# Patient Record
Sex: FEMALE | Race: WHITE
[De-identification: names, ages, dates, MRNs, and addresses within clinical notes are randomized per-mention and may not be internally consistent; named-entity substitution may affect disease eponyms.]

---

## 2018-11-27 ENCOUNTER — HOSPITAL ENCOUNTER (EMERGENCY)
Dept: HOSPITAL 62 - ER | Age: 23
Discharge: HOME | End: 2018-11-27
Payer: COMMERCIAL

## 2018-11-27 VITALS — DIASTOLIC BLOOD PRESSURE: 86 MMHG | SYSTOLIC BLOOD PRESSURE: 107 MMHG

## 2018-11-27 DIAGNOSIS — M79.89: ICD-10-CM

## 2018-11-27 DIAGNOSIS — M79.605: Primary | ICD-10-CM

## 2018-11-27 LAB
ADD MANUAL DIFF: NO
BASOPHILS # BLD AUTO: 0 10^3/UL (ref 0–0.2)
BASOPHILS NFR BLD AUTO: 0.5 % (ref 0–2)
EOSINOPHIL # BLD AUTO: 0.3 10^3/UL (ref 0–0.6)
EOSINOPHIL NFR BLD AUTO: 3.1 % (ref 0–6)
ERYTHROCYTE [DISTWIDTH] IN BLOOD BY AUTOMATED COUNT: 12.4 % (ref 11.5–14)
HCT VFR BLD CALC: 42.7 % (ref 36–47)
HGB BLD-MCNC: 14.9 G/DL (ref 12–15.5)
LYMPHOCYTES # BLD AUTO: 1.9 10^3/UL (ref 0.5–4.7)
LYMPHOCYTES NFR BLD AUTO: 22.1 % (ref 13–45)
MCH RBC QN AUTO: 29.5 PG (ref 27–33.4)
MCHC RBC AUTO-ENTMCNC: 34.9 G/DL (ref 32–36)
MCV RBC AUTO: 85 FL (ref 80–97)
MONOCYTES # BLD AUTO: 0.7 10^3/UL (ref 0.1–1.4)
MONOCYTES NFR BLD AUTO: 7.7 % (ref 3–13)
NEUTROPHILS # BLD AUTO: 5.7 10^3/UL (ref 1.7–8.2)
NEUTS SEG NFR BLD AUTO: 66.6 % (ref 42–78)
PLATELET # BLD: 236 10^3/UL (ref 150–450)
RBC # BLD AUTO: 5.05 10^6/UL (ref 3.72–5.28)
TOTAL CELLS COUNTED % (AUTO): 100 %
WBC # BLD AUTO: 8.6 10^3/UL (ref 4–10.5)

## 2018-11-27 PROCEDURE — 36415 COLL VENOUS BLD VENIPUNCTURE: CPT

## 2018-11-27 PROCEDURE — 85025 COMPLETE CBC W/AUTO DIFF WBC: CPT

## 2018-11-27 PROCEDURE — 81025 URINE PREGNANCY TEST: CPT

## 2018-11-27 PROCEDURE — 99284 EMERGENCY DEPT VISIT MOD MDM: CPT

## 2018-11-27 PROCEDURE — 93971 EXTREMITY STUDY: CPT

## 2018-11-27 NOTE — ER DOCUMENT REPORT
ED Medical Screen (RME)





- General


Chief Complaint: Leg Pain


Stated Complaint: LEG SWELLING


Time Seen by Provider: 11/27/18 19:59


TRAVEL OUTSIDE OF THE [place] IN LAST 30 DAYS: No





- Related Data


Allergies/Adverse Reactions: 


 





No Known Allergies Allergy (Unverified 11/27/18 17:56)


 











Past Medical History





- Social History


Frequency of alcohol use: Occasional


Renal/ Medical History: Denies: Hx Peritoneal Dialysis


Past Surgical History: Reports: Hx Tonsillectomy





Physical Exam





- Vital signs


Vitals: 





 











Temp Pulse Resp BP Pulse Ox


 


 98.0 F   79   16   109/71   98 


 


 11/27/18 18:09  11/27/18 18:09  11/27/18 18:09  11/27/18 18:09  11/27/18 18:09














Course





- Re-evaluation


Re-evalutation: 





11/27/18 20:09


22-year-old female presents for evaluation of a swollen left lower extremity 

following an 11-hour car trip.


She was seen at urgent care and told to come to the emergency room for further 

investigation.


We will obtain ultrasound of the left lower extremity will obtain pregnancy 

test and CBC.


I have seen and performed a rapid medical screening examination on this patient.


This patient will require further evaluation and disposition determination by a 

secondary provider.





- Vital Signs


Vital signs: 





 











Temp Pulse Resp BP Pulse Ox


 


 98.0 F   79   16   109/71   98 


 


 11/27/18 18:09  11/27/18 18:09  11/27/18 19:59  11/27/18 18:09  11/27/18 18:09

## 2018-11-27 NOTE — ER DOCUMENT REPORT
ED Extremity Problem, Lower





- General


Chief Complaint: Leg Pain


Stated Complaint: LEG SWELLING


Time Seen by Provider: 11/27/18 19:59


Mode of Arrival: Ambulatory


Information source: Patient


TRAVEL OUTSIDE OF THE [place] IN LAST 30 DAYS: No





- HPI


Patient complains to provider of: Pain


Location: Leg


Occurred: This afternoon


Onset/Duration: Gradual


Quality of pain: Achy


Severity: Mild


Pain Level: 2


Recent injury: No


Exacerbated by: Movement


Notes: 





Patient is a 22-year-old female presenting to the emergency room complaining of 

pain in her left leg, patient just completed an 11 [address] Dr[address] from [facility] and arrived to [facility] at 4 PM today, at 

which time she promptly signed herself into the emergency room because she was 

having pain in the left leg and was concerned about the possibility of a DVT, 

patient has no history of DVT, she does not smoke cigarettes, she did recently 

quit, and she does not currently take any over-the-counter contraceptive, there 

is no injury to the lower extremity, she denies any chest pain or shortness of 

breath, simply states that she saw something on a television show regarding a 

DVT and therefore became concerned after her long drive today





- Related Data


Allergies/Adverse Reactions: 


 





No Known Allergies Allergy (Unverified 11/27/18 17:56)


 











Past Medical History





- General


Information source: Patient





- Social History


Smoking Status: Former Smoker


Frequency of alcohol use: Occasional


Family History: Reviewed & Not Pertinent


Patient has suicidal ideation: No


Patient has homicidal ideation: No


Renal/ Medical History: Denies: Hx Peritoneal Dialysis


Past Surgical History: Reports: Hx Tonsillectomy





Review of Systems





- Review of Systems


Constitutional: No symptoms reported


EENT: No symptoms reported


Cardiovascular: No symptoms reported


Respiratory: No symptoms reported


Gastrointestinal: No symptoms reported


Genitourinary: No symptoms reported


Female Genitourinary: No symptoms reported


Musculoskeletal: See HPI


Skin: No symptoms reported


Hematologic/Lymphatic: No symptoms reported


Neurological/Psychological: No symptoms reported


-: Yes All other systems reviewed and negative





Physical Exam





- Vital signs


Vitals: 


 











Temp Pulse Resp BP Pulse Ox


 


 98.0 F   79   16   109/71   98 


 


 11/27/18 18:09  11/27/18 18:09  11/27/18 18:09  11/27/18 18:09  11/27/18 18:09














- Notes


Notes: 





- General


General appearance: Appears well, Alert


In distress: None





- HEENT


Head: Normocephalic, Atraumatic


Eyes: Normal


Conjunctiva: Normal


Extraocular movements intact: Yes


Eyelashes: Normal


Pupils: PERRL





- Respiratory


Respiratory status: No respiratory distress





- Cardiovascular


Rhythm: Regular





- Abdominal


Inspection: Normal





- Back


Back: Normal





- Extremities


General upper extremity: Normal inspection


General lower extremity: Slight tenderness to palpate in the left suprapatellar 

space, no calf tenderness, distal sensation and motor is intact





- Neurological


Neuro grossly intact: Yes


Orientation: AAOx4


Pharr Coma Scale Eye Opening: Spontaneous


Alfredo Coma Scale Verbal: Oriented


Alfredo Coma Scale Motor: Obeys Commands


Pharr Coma Scale Total: 15





- Psychological


Associated symptoms: Normal affect, Normal mood





- Skin


Skin Temperature: Warm


Skin Moisture: Dry


Skin Color: Normal





Course





- Re-evaluation


Re-evalutation: 





11/28/18 00:07


Lower extremity Dopplers negative for DVT, patient was therefore advised to 

drink plenty of fluids, take Motrin as needed for pain, follow-up with her 

primary care provider or return if symptoms worsen, patient acknowledges 

understanding and agreement with this plan





- Vital Signs


Vital signs: 


 











Temp Pulse Resp BP Pulse Ox


 


 98.6 F   63   18   107/86 H  100 


 


 11/27/18 22:32  11/27/18 22:32  11/27/18 22:32  11/27/18 22:32  11/27/18 22:32














- Laboratory


Result Diagrams: 


 11/27/18 20:55








- Diagnostic Test


Radiology reviewed: Image reviewed, Reports reviewed





Discharge





- Discharge


Clinical Impression: 


 Leg pain, left





Condition: Stable


Disposition: HOME, SELF-CARE


Instructions:  Leg Pain Nonspecific ([facility]), Possible Evolving Leg DVT ([facility])


Additional Instructions: 


Follow up with your primary care provider in one to 2 days.  Return to the 

emergency room immediately if symptoms worsen or any additional concerns.

## 2018-11-28 NOTE — RADIOLOGY REPORT (SQ)
PROCEDURE: Ultrasound of the left lower extremity deep veins



CLINICAL HISTORY and INDICATION: Leg pain



COMPARISON: 



None.



TECHNIQUE: 



Gray scale imaging with duplex interrogation of the left lower

extremity venous system was performed and multiple static images

were obtained.



FINDINGS: 



Utilizing compression and augmentation, there is no deep venous

thrombus in the left common femoral, superficial femoral or

popliteal veins.



 The left profunda femoris, posterior tibial and deep peroneal

veins are patent and compressible.  .



The bilateral greater saphenous vein at the saphenofemoral

junction is patent and compressible.



There is no visualization of any subcutaneous fluid collections

in either lower extremity.



There is no visualization of any fluid collections in the right

and left popliteal fossa.



There is no evidence of reactive or pathological lymphadenopathy

in the evaluated bilateral lower extremities.





IMPRESSION: 



No deep venous thrombosis of the  left lower extremity. 



Location of Interpretation:



 [id number]